# Patient Record
(demographics unavailable — no encounter records)

---

## 2024-10-09 NOTE — HISTORY OF PRESENT ILLNESS
[de-identified] : Chasity Gilmore is a 57 yo female with hx thyroid nodule s/p hemithryoidectomy who presents for evaluation of left ear itching and burning. She notes that she has had this issue bilaterally for years. She occasionally fluocinolone drops for this. She denies otorrhea, tinnitus, vertigo, or hearing loss. She denies facial weakness or numbness. She denies recent fevers or chills. She endorses snoring and has diagnosis of obstructive sleep apnea. She has been using CPAP but her machine was recalled. She states that she needs a new sleep study to order a new machine. [FreeTextEntry1] : 10/6/22 - Ms. Gilmore presents for follow-up. She had her sleep study showing AHI of 19. She is having difficulty with her current CPAP machine as the pressure is too high. She denies fevers or chills.  1/18/23 - Ms. Gilmore presents for follow-up. She is using auto-pap and has been using it every night, about five hours per night. She notes bilateral constant pulsatile tinnitus for the past six months. She denies hearing change. She denies otalgia, otorrhea, or vertigo. She denies fevers, chills. She denies autophony. She denies history of recurrent ear infections. She denies family history of early onset hearing loss. She had a MRA brain and neck which was normal per her report. She has the MRA neck report.  1/26/23 - Chasity Gilmore presents for follow-up. She obtained CT temporal bone. She notes continued bilateral pulsatile tinnitus. she notes mild intermittent left otalgia starting today. She denies otorrhea, vertigo, hearing loss. She denies fevers or chills.  10/9/24 - Ms. Gilmore presents for follow-up. She is using auto pap but is having difficulty with it. She notes that pulsatile tinnitus has largely resolved. Denies change in hearing. Denies nasal congestion, rhinorrhea, postnasal drainage, or sinus pressure. No fevers.

## 2024-10-09 NOTE — DATA REVIEWED
[de-identified] : MRI brain 8/28/24: FINDINGS: There is mild diffuse parenchymal volume loss.  There are nonspecific T2 prolongation signal abnormalities in the periventricular subcortical white matter likely related to minimal chronic microvascular ischemic changes.  There is no acute parenchymal hemorrhage, or midline shift. There is no extra-axial fluid collection. There is no hydrocephalus. There is no acute infarct.  Tortuous distal right internal carotid artery noted. Mass effect on the pituitary gland noted with deviation of the pituitary stalk to the left.  Retention cysts polyps and mucosal thickening noted in the paranasal sinuses. Incidental stable chronic orbital staphyloma noted.    IMPRESSION: No acute intracranial hemorrhage or acute infarct.  Minimal chronic microvascular ischemic changes

## 2024-10-09 NOTE — ASSESSMENT
[FreeTextEntry1] : Lluvia Gilmore presents for follow-up. Her pulsatile tinnitus has resolved. She had previous MRA brain/neck with her neurologist which were normal per her report. Hearing was normal on previous audio. She had CT temporal bone which showed lateralized petrous segments of bilateral internal carotid arteries as well as thinning/dehiscence of bilateral carotid plates. She had MRI brain which showed incidental sinus retention cysts but she has no sinonasal symptoms. No further intervention is needed. She will see her opthalmologist regarding ocular findings on MRI. She has moderate ROMAN and is using auto-pap but is not tolerating this. Will refer to Dr. Howe for Inspire evaluation.  - refer to Dr. Howe - Follow up in 3 months.

## 2024-10-13 NOTE — PHYSICAL EXAM
[No Acute Distress] : no acute distress [Well Nourished] : well nourished [Well Developed] : well developed [Well-Appearing] : well-appearing [Normal Sclera/Conjunctiva] : normal sclera/conjunctiva [Normal Outer Ear/Nose] : the outer ears and nose were normal in appearance [No JVD] : no jugular venous distention [No Lymphadenopathy] : no lymphadenopathy [Supple] : supple [No Respiratory Distress] : no respiratory distress  [No Accessory Muscle Use] : no accessory muscle use [Clear to Auscultation] : lungs were clear to auscultation bilaterally [Normal Rate] : normal rate  [Regular Rhythm] : with a regular rhythm [Normal S1, S2] : normal S1 and S2 [No Murmur] : no murmur heard [Pedal Pulses Present] : the pedal pulses are present [No Edema] : there was no peripheral edema [No Extremity Clubbing/Cyanosis] : no extremity clubbing/cyanosis [Soft] : abdomen soft [Non Tender] : non-tender [Non-distended] : non-distended [Normal Posterior Cervical Nodes] : no posterior cervical lymphadenopathy [Normal Anterior Cervical Nodes] : no anterior cervical lymphadenopathy [No CVA Tenderness] : no CVA  tenderness [No Spinal Tenderness] : no spinal tenderness [Grossly Normal Strength/Tone] : grossly normal strength/tone [No Rash] : no rash [Coordination Grossly Intact] : coordination grossly intact [No Focal Deficits] : no focal deficits [Normal Gait] : normal gait [Normal Affect] : the affect was normal [Normal Insight/Judgement] : insight and judgment were intact [de-identified] : ? ganglion cyst left lateral forearm

## 2024-10-13 NOTE — HISTORY OF PRESENT ILLNESS
[de-identified] : Ms. PANFILO MONET is a 60 year old female with Hx of HLD, high risk for DM, HTN, migraine headaches, ROMAN on CPAP, who presents for a follow up visit and Rx renewals. She is accompanied by her .  Pt states she is feeling well. States she has a lump on her left arm. Denies any SOB, CP, abdominal pain, N/V/D, dizziness, or leg swelling. Reports compliance with taking her meds daily.

## 2024-10-13 NOTE — HISTORY OF PRESENT ILLNESS
[de-identified] : Ms. PANFILO MONET is a 60 year old female with Hx of HLD, high risk for DM, HTN, migraine headaches, ROMAN on CPAP, who presents for a follow up visit and Rx renewals. She is accompanied by her .  Pt states she is feeling well. States she has a lump on her left arm. Denies any SOB, CP, abdominal pain, N/V/D, dizziness, or leg swelling. Reports compliance with taking her meds daily.

## 2024-10-13 NOTE — ADDENDUM
[FreeTextEntry1] : Documented by Cornelia Martino acting as a scribe for Dr. Marissa Young. 10/08/2024   All medical record entries made by the scribe were at my, Dr. Marissa Young, direction and personally dictated by me on 10/08/2024. I have reviewed the chart and agree that the record accurately reflects my personal performance of the history, physical exam, assessment and plan. I have also personally directed, reviewed, and agreed with the chart.

## 2024-10-13 NOTE — PHYSICAL EXAM
[No Acute Distress] : no acute distress [Well Nourished] : well nourished [Well Developed] : well developed [Well-Appearing] : well-appearing [Normal Sclera/Conjunctiva] : normal sclera/conjunctiva [Normal Outer Ear/Nose] : the outer ears and nose were normal in appearance [No JVD] : no jugular venous distention [No Lymphadenopathy] : no lymphadenopathy [Supple] : supple [No Respiratory Distress] : no respiratory distress  [No Accessory Muscle Use] : no accessory muscle use [Clear to Auscultation] : lungs were clear to auscultation bilaterally [Normal Rate] : normal rate  [Regular Rhythm] : with a regular rhythm [Normal S1, S2] : normal S1 and S2 [No Murmur] : no murmur heard [Pedal Pulses Present] : the pedal pulses are present [No Edema] : there was no peripheral edema [No Extremity Clubbing/Cyanosis] : no extremity clubbing/cyanosis [Soft] : abdomen soft [Non Tender] : non-tender [Non-distended] : non-distended [Normal Posterior Cervical Nodes] : no posterior cervical lymphadenopathy [Normal Anterior Cervical Nodes] : no anterior cervical lymphadenopathy [No CVA Tenderness] : no CVA  tenderness [No Spinal Tenderness] : no spinal tenderness [Grossly Normal Strength/Tone] : grossly normal strength/tone [No Rash] : no rash [Coordination Grossly Intact] : coordination grossly intact [No Focal Deficits] : no focal deficits [Normal Gait] : normal gait [Normal Affect] : the affect was normal [Normal Insight/Judgement] : insight and judgment were intact [de-identified] : ? ganglion cyst left lateral forearm

## 2024-11-25 NOTE — ASSESSMENT
[FreeTextEntry1] :  Assessment/Plan: #1 ROMAN- moderate   Has previously trialed and not tolerated oral mandibular advancement device. She is definitely not interested in Inspire. She will reach out to Dr. Cartwright's office to trial a different ROMAN mask.

## 2024-11-25 NOTE — PROCEDURE
[de-identified] : Procedure: Transnasal flexible laryngoscopy   Description: Informed consent was obtained from the patient prior to the procedure. The patient was seated in the clinic chair. Topical anesthesia was achieved by first spraying the nasal cavities with 4% lidocaine and 1% phenylephrine.   Exam: This demonstrates a clear vallecula and crisp epiglottis. The aryepiglottic folds are intact and symmetric bilaterally. The hypopharynx does not demonstrate pooling. The interarytenoid space demonstrates no lesions. It does not demonstrate pachydermia. The false vocal folds are symmetric and without lesions or masses. False fold voicing (plica ventricularis) is not noted today. The true vocal folds show normal and symmetric motion bilaterally. There is no paradoxical motion. The right medial edge is crisp and shows no lesions or masses. The left medial edge is crisp and shows no lesions or masses. The mucosal covering is minimally edematous. There is not erythema present today. There are no obvious vascular ectasias present. The vocal processes do not demonstrate granulomas. The subglottis and proximal trachea is clear and unobstructed to the limits of the examination today.

## 2024-11-25 NOTE — PROCEDURE
[de-identified] : Procedure: Transnasal flexible laryngoscopy   Description: Informed consent was obtained from the patient prior to the procedure. The patient was seated in the clinic chair. Topical anesthesia was achieved by first spraying the nasal cavities with 4% lidocaine and 1% phenylephrine.   Exam: This demonstrates a clear vallecula and crisp epiglottis. The aryepiglottic folds are intact and symmetric bilaterally. The hypopharynx does not demonstrate pooling. The interarytenoid space demonstrates no lesions. It does not demonstrate pachydermia. The false vocal folds are symmetric and without lesions or masses. False fold voicing (plica ventricularis) is not noted today. The true vocal folds show normal and symmetric motion bilaterally. There is no paradoxical motion. The right medial edge is crisp and shows no lesions or masses. The left medial edge is crisp and shows no lesions or masses. The mucosal covering is minimally edematous. There is not erythema present today. There are no obvious vascular ectasias present. The vocal processes do not demonstrate granulomas. The subglottis and proximal trachea is clear and unobstructed to the limits of the examination today.

## 2024-11-25 NOTE — HISTORY OF PRESENT ILLNESS
[de-identified] : PANFILO MONET  is a 60 year  old woman who presents to the [facility] for their obstructive sleep apnea. She is referred by Dr. Dexter Cartwright. AHI: 19.2 (documented from sleep study performed on 9/23/22). However the records we have from this sleep study do not mention central component.  She has not had prior DISE.  Reasons for not able to use CPAP/BiPAP as recommended: gets a headache and feels it is difficult to breath with it on. Current alcohol usage: none Prior throat surgery: hemithyroidectomy 2012  Criteria for Inspire hypoglossal nerve implant:  She is unable or unwilling to appropriately use CPAP or BiPAP. Currently unknown if she is free of complete concentric collapse at the palate Potential contraindications for inspire hypoglossal nerve implant: She has a BMI that is: 25.52 Denies dyspnea, dysphonia, dysphagia, odynophagia, fevers/chills, and unintentional weight loss.

## 2024-11-25 NOTE — CONSULT LETTER
[Dear  ___] : Dear  [unfilled], [Consult Letter:] : I had the pleasure of evaluating your patient, [unfilled]. [Please see my note below.] : Please see my note below. [Consult Closing:] : Thank you very much for allowing me to participate in the care of this patient.  If you have any questions, please do not hesitate to contact me. [Sincerely,] : Sincerely, [FreeTextEntry2] : Dr. Dexter Cartwright [FreeTextEntry3] : Wil[facility] Howe M.D. Division of Laryngology | Department of Otolaryngology  [address] [address]

## 2024-11-25 NOTE — HISTORY OF PRESENT ILLNESS
[de-identified] : PANFILO MONET  is a 60 year  old woman who presents to the [facility] for their obstructive sleep apnea. She is referred by Dr. Dexter Cartwright. AHI: 19.2 (documented from sleep study performed on 9/23/22). However the records we have from this sleep study do not mention central component.  She has not had prior DISE.  Reasons for not able to use CPAP/BiPAP as recommended: gets a headache and feels it is difficult to breath with it on. Current alcohol usage: none Prior throat surgery: hemithyroidectomy 2012  Criteria for Inspire hypoglossal nerve implant:  She is unable or unwilling to appropriately use CPAP or BiPAP. Currently unknown if she is free of complete concentric collapse at the palate Potential contraindications for inspire hypoglossal nerve implant: She has a BMI that is: 25.52 Denies dyspnea, dysphonia, dysphagia, odynophagia, fevers/chills, and unintentional weight loss.

## 2024-11-25 NOTE — CONSULT LETTER
[Dear  ___] : Dear  [unfilled], [Consult Letter:] : I had the pleasure of evaluating your patient, [unfilled]. [Please see my note below.] : Please see my note below. [Consult Closing:] : Thank you very much for allowing me to participate in the care of this patient.  If you have any questions, please do not hesitate to contact me. [Sincerely,] : Sincerely, [FreeTextEntry2] : Dr. Dexter Cartwright [FreeTextEntry3] : Wil[facility] Howe M.D. Division of Laryngology | Department of Otolaryngology  [address] [address]

## 2025-01-10 NOTE — HISTORY OF PRESENT ILLNESS
[de-identified] :  Chasity Gilmore is a 57 yo female with hx thyroid nodule s/p hemithryoidectomy who presents for evaluation of left ear itching and burning. She notes that she has had this issue bilaterally for years. She occasionally fluocinolone drops for this. She denies otorrhea, tinnitus, vertigo, or hearing loss. She denies facial weakness or numbness. She denies recent fevers or chills. She endorses snoring and has diagnosis of obstructive sleep apnea. She has been using CPAP but her machine was recalled. She states that she needs a new sleep study to order a new machine.   [FreeTextEntry1] : 10/6/22 - Ms. Gilmore presents for follow-up. She had her sleep study showing AHI of 19. She is having difficulty with her current CPAP machine as the pressure is too high. She denies fevers or chills.  1/18/23 - Ms. Gilmore presents for follow-up. She is using auto-pap and has been using it every night, about five hours per night. She notes bilateral constant pulsatile tinnitus for the past six months. She denies hearing change. She denies otalgia, otorrhea, or vertigo. She denies fevers, chills. She denies autophony. She denies history of recurrent ear infections. She denies family history of early onset hearing loss. She had a MRA brain and neck which was normal per her report. She has the MRA neck report.  1/26/23 - Chasity Gilmore presents for follow-up. She obtained CT temporal bone. She notes continued bilateral pulsatile tinnitus. she notes mild intermittent left otalgia starting today. She denies otorrhea, vertigo, hearing loss. She denies fevers or chills.  10/9/24 - Ms. Gilmore presents for follow-up. She is using auto pap but is having difficulty with it. She notes that pulsatile tinnitus has largely resolved. Denies change in hearing. Denies nasal congestion, rhinorrhea, postnasal drainage, or sinus pressure. No fevers.  1/10/25 - Ms. Gilmoer presents for follow-up. She saw Dr. Howe but is not interested in Inspire. She is using auto pap but notes significant discomfort with increased nasla pressure. She denies rhinorrhea or postnasal drainage. She denies sinus pressure. No fevers. She has tried some nasal spray previously.

## 2025-01-10 NOTE — ASSESSMENT
[FreeTextEntry1] : Lluvia Gilmore presents for follow-up. Her pulsatile tinnitus has resolved. She had previous MRA brain/neck with her neurologist which were normal per her report. Hearing was normal on previous audio. She had CT temporal bone which showed lateralized petrous segments of bilateral internal carotid arteries as well as thinning/dehiscence of bilateral carotid plates. She had MRI brain which showed incidental sinus retention cysts.  She has moderate ROMAN. She saw Dr. Howe for Inspire but she defers at this time. She has difficulty tolerating her auto pap and does not have good compliance on recent testing. On exam, she has bilateral nasal valve collapse. Sinonasla endoscopy was performed showing bilatearl inferior turbinate hypertrophy and mild septal deviation to right.  We discussed bilateral vivaer nasal valve repair and inferior turbinate reduction. R/b/a discussed. Risks include but are not limited to bleeding, infection, crusting, scarring, injury to nasal mucosa/skin, nasal swelling, need for future procedure. All questions were answered. She will consider.  - start fluticasone 2 sprays to each nostril BID x 1 mo. - pt wishes to try different mask for auto pap. - Follow up in 3 months.

## 2025-01-10 NOTE — PHYSICAL EXAM
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 [de-identified] : Bilateral inferior turbinate hypertrophy.

## 2025-02-12 NOTE — HEALTH RISK ASSESSMENT
[de-identified] : Drinks one glass of wine weekly. [de-identified] : Admits her diet could be better.  [de-identified] : Maintains active by walking.  [[id number]] : 0 [MammogramDate] : 02/2024 [PapSmearDate] : 05/2022 [BoneDensityDate] : 01/2024 [BoneDensityComments] : Osteopenia. [ColonoscopyDate] : 05/2021 [ColonoscopyComments] : screening.  [de-identified] : Lives with her .  [FreeTextEntry3] : Has two sons.

## 2025-02-12 NOTE — HISTORY OF PRESENT ILLNESS
[de-identified] :  Ms. PANFILO MONET is a 61 year old female with hx of HLD, high risk for DM, HTN, migraine headaches, ROMAN on CPAP, thyroid nodule s/p hemithryoidectomy, presenting for an annual physical. She is accompanied by her spouse.  Pt states she is feeling well. Offers no complaints. Denies any SOB, CP, abdominal pain, N/V/D, headache, dizziness, or leg swelling.  Reports compliance with taking her meds daily.

## 2025-02-12 NOTE — HEALTH RISK ASSESSMENT
[de-identified] : Drinks one glass of wine weekly. [de-identified] : Admits her diet could be better.  [de-identified] : Maintains active by walking.  [[id number]] : 0 [MammogramDate] : 02/2024 [PapSmearDate] : 05/2022 [BoneDensityDate] : 01/2024 [BoneDensityComments] : Osteopenia. [ColonoscopyDate] : 05/2021 [ColonoscopyComments] : screening.  [de-identified] : Lives with her .  [FreeTextEntry3] : Has two sons.

## 2025-02-12 NOTE — HISTORY OF PRESENT ILLNESS
[de-identified] :  Ms. PANFILO MONET is a 61 year old female with hx of HLD, high risk for DM, HTN, migraine headaches, ROMAN on CPAP, thyroid nodule s/p hemithryoidectomy, presenting for an annual physical. She is accompanied by her spouse.  Pt states she is feeling well. Offers no complaints. Denies any SOB, CP, abdominal pain, N/V/D, headache, dizziness, or leg swelling.  Reports compliance with taking her meds daily.

## 2025-02-12 NOTE — ASSESSMENT
[FreeTextEntry1] : Physical  UTD with mammogram, bone density scan and colonoscopy  thyroid nodule s/p hemithryoidectomy referred for thyroid US   Migraines cont Nurtec prn  HTN cont Metoprolol 50 mg daily Reinforced the importance of following a low sodium diet and exercise  High risk for diabetes Reinforced the importance of following a low sugar/low carbohydrate diet and exercise We'll check glucose and HbA1c today.  HLD cont Crestor 10 mg daily cont Zetia 10 mg daily Reinforced the importance of following a low fat/low cholesterol diet and exercise We'll check Lipid panel and LFTs today.  meds renewed.  Blood work ordered. Follow up in one week for lab results

## 2025-02-12 NOTE — ADDENDUM
[FreeTextEntry1] : Documented by Karla Mathews acting as a scribe for Dr. Marissa Young. 02/11/2025  All medical records entries made by the scribe were at my, Dr. Young, direction and personally dictated by me on 02/11/2025. I have reviewed the chart and agree that the record accurately reflects my personal performance of the history, physical exam, assessment and plan. I have also personally directed, reviewed, and agreed with the chart.

## 2025-04-29 NOTE — HISTORY OF PRESENT ILLNESS
[de-identified] : Ms. PANFILO MONET is a 60-year-old female, accompanied by her , with hx. of HLD, Migraine headaches, ROMAN on CPAP, who presents for an acute visit.  Pt. states for the past 2 weeks she has been having a slight cough and occasionally hears a wheeze. Says symptoms are worse when she goes outside. Also says her eyes have been itchy and watery and has some itchiness in her left ear. She took OTC cough medicine but it did not help. She used Pataday eye drops which helped with the itchy eyes Denies fever, chills, body aches, SOB, chest pain, abdominal pain, N/V/D,

## 2025-04-29 NOTE — ASSESSMENT
[FreeTextEntry1] :  Allergies start Claritin 10mg daily cont Patanol eye drops  thyroid nodule s/p hemithyroidectomy thyroid US: benign appearing left thyroid nodules  Migraines cont Nurtec prn  HTN cont Metoprolol 50 mg daily Reinforced the importance of following a low sodium diet and exercise  High risk for diabetes Reinforced the importance of following a low sugar/low carbohydrate diet and exercise  HLD cont Crestor 10 mg daily cont Zetia 10 mg daily Reinforced the importance of following a low fat/low cholesterol diet and exercise

## 2025-04-29 NOTE — PHYSICAL EXAM
[No Acute Distress] : no acute distress [Well Nourished] : well nourished [Well Developed] : well developed [Well-Appearing] : well-appearing [Normal Sclera/Conjunctiva] : normal sclera/conjunctiva [Normal Outer Ear/Nose] : the outer ears and nose were normal in appearance [Normal Oropharynx] : the oropharynx was normal [Normal TMs] : both tympanic membranes were normal [No JVD] : no jugular venous distention [No Lymphadenopathy] : no lymphadenopathy [Supple] : supple [No Respiratory Distress] : no respiratory distress  [No Accessory Muscle Use] : no accessory muscle use [Clear to Auscultation] : lungs were clear to auscultation bilaterally [Normal Rate] : normal rate  [Regular Rhythm] : with a regular rhythm [Normal S1, S2] : normal S1 and S2 [No Murmur] : no murmur heard [Pedal Pulses Present] : the pedal pulses are present [No Edema] : there was no peripheral edema [No Extremity Clubbing/Cyanosis] : no extremity clubbing/cyanosis [Soft] : abdomen soft [Non Tender] : non-tender [Non-distended] : non-distended [Normal Bowel Sounds] : normal bowel sounds [Normal Posterior Cervical Nodes] : no posterior cervical lymphadenopathy [Normal Anterior Cervical Nodes] : no anterior cervical lymphadenopathy [No CVA Tenderness] : no CVA  tenderness [No Joint Swelling] : no joint swelling [No Rash] : no rash [Coordination Grossly Intact] : coordination grossly intact [No Focal Deficits] : no focal deficits [Normal Gait] : normal gait [Normal Affect] : the affect was normal [Normal Insight/Judgement] : insight and judgment were intact